# Patient Record
Sex: MALE | Employment: OTHER | RURAL
[De-identification: names, ages, dates, MRNs, and addresses within clinical notes are randomized per-mention and may not be internally consistent; named-entity substitution may affect disease eponyms.]

---

## 2017-12-05 ENCOUNTER — OFFICE VISIT (OUTPATIENT)
Dept: SURGERY | Age: 62
End: 2017-12-05

## 2017-12-05 VITALS
HEIGHT: 73 IN | BODY MASS INDEX: 24.45 KG/M2 | HEART RATE: 74 BPM | WEIGHT: 184.5 LBS | DIASTOLIC BLOOD PRESSURE: 78 MMHG | SYSTOLIC BLOOD PRESSURE: 108 MMHG

## 2017-12-05 DIAGNOSIS — Z12.11 SCREEN FOR COLON CANCER: Primary | ICD-10-CM

## 2017-12-06 RX ORDER — POLYETHYLENE GLYCOL 3350, SODIUM CHLORIDE, SODIUM BICARBONATE, POTASSIUM CHLORIDE 420; 11.2; 5.72; 1.48 G/4L; G/4L; G/4L; G/4L
POWDER, FOR SOLUTION ORAL
Qty: 1 BOTTLE | Refills: 0 | Status: SHIPPED | OUTPATIENT
Start: 2017-12-06 | End: 2017-12-18

## 2017-12-06 RX ORDER — BISACODYL 5 MG
TABLET, DELAYED RELEASE (ENTERIC COATED) ORAL
Qty: 1 TAB | Refills: 0 | Status: SHIPPED | OUTPATIENT
Start: 2017-12-06 | End: 2017-12-18

## 2017-12-06 RX ORDER — SODIUM CHLORIDE, SODIUM LACTATE, POTASSIUM CHLORIDE, CALCIUM CHLORIDE 600; 310; 30; 20 MG/100ML; MG/100ML; MG/100ML; MG/100ML
200 INJECTION, SOLUTION INTRAVENOUS CONTINUOUS
Status: CANCELLED | OUTPATIENT
Start: 2017-12-07 | End: 2017-12-07

## 2017-12-07 NOTE — PROGRESS NOTES
[address] [facility], [address] [facility]  Phone: [phone] Fax: [phone]                                              OFFICE NOTE    NAME: Marge Sung  : 1955    Chief Complaint:   Chief Complaint   Patient presents with    Colon Cancer Screening       History: Marge Sung is a 60 y.o.  male referred for colonoscopy. This is  the patient's first colonoscopy. The bowel movements are regular and brown. He   does not use any meds on a regular basis for his bowels. He denies any blood in stools or hemorrhoidal disease. Family history is negative for colon cancer but father had colon polyps. Past Medical History:   Diagnosis Date    Depression     Herpes zoster     HIV (human immunodeficiency virus infection) ([facility]     Hypercholesterolemia     Hypertension      History reviewed. No pertinent surgical history. Current Outpatient Prescriptions   Medication Sig Dispense Refill    peg-electrolyte soln (GAVILYTE-N) 420 gram solution Take as directed  Indications: BOWEL EVACUATION 1 Bottle 0    bisacodyl (DULCOLAX, BISACODYL,) 5 mg EC tablet Take as directed  Indications: BOWEL EVACUATION 1 Tab 0    ARIPiprazole (ABILIFY) 5 mg tablet Take  by mouth daily.  acyclovir (ZOVIRAX) 800 mg tablet Take 800 mg by mouth two (2) times a day.  testosterone (ANDROGEL) 1 % (50 mg/5 gram) glpk 50 mg by TransDERmal route daily.  abacavir-lamiVUDine (EPZICOM) 600-300 mg tablet Take 1 tablet by mouth daily.  escitalopram oxalate (LEXAPRO) 20 mg tablet Take 20 mg by mouth daily.  losartan-hydrochlorothiazide (HYZAAR) 50-12.5 mg per tablet Take 1 tablet by mouth daily.  lovastatin (MEVACOR) 20 mg tablet Take 20 mg by mouth daily.  nevirapine (VIRAMUNE) 200 mg tablet Take 200 mg by mouth two (2) times a day.  buPROPion XL (WELLBUTRIN XL) 150 mg tablet Take 150 mg by mouth every morning.        Allergies   Allergen Reactions    Lorabid [Loracarbef] Other (comments)     Fever, rash, erythema     Social History     Social History    Marital status: SINGLE     Spouse name: N/A    Number of children: N/A    Years of education: N/A     Occupational History    Not on file. Social History Main Topics    Smoking status: Current Every Day Smoker    Smokeless tobacco: Never Used    Alcohol use Yes    Drug use: Not on file    Sexual activity: Not on file     Other Topics Concern    Not on file     Social History Narrative     Family History   Problem Relation Age of Onset    No Known Problems Mother        Patient Active Problem List   Diagnosis Code    Anogenital HPV infection A63.0    [facility] B[facility]       Review of Systems:  Review of Systems   Constitutional: Negative for chills, fever, malaise/fatigue and weight loss. HENT: Positive for tinnitus. Negative for congestion, ear discharge, ear pain, hearing loss, nosebleeds and sore throat. Eyes: Negative for blurred vision, double vision, pain, discharge and redness. Respiratory: Negative for cough, sputum production, shortness of breath and wheezing. Cardiovascular: Negative for chest pain, palpitations and leg swelling. Gastrointestinal: Negative for abdominal pain, blood in stool, constipation, diarrhea, heartburn, melena, nausea and vomiting. Genitourinary: Negative for frequency, hematuria and urgency. Musculoskeletal: Negative for back pain, joint pain and neck pain. Skin: Negative for itching and rash. Neurological: Negative for dizziness, tremors, focal weakness, seizures, weakness and headaches. Endo/Heme/Allergies: Does not bruise/bleed easily. Psychiatric/Behavioral: Positive for depression. Negative for memory loss. The patient is not nervous/anxious and does not have insomnia.           Physical Exam:  Visit Vitals    /78 (BP 1 Location: Left arm, BP Patient Position: Sitting)    Pulse 74    Ht 6' 1\" (1.854 m)    Wt 184 lb 8 oz (83.7 kg)    BMI 24.34 kg/m2       Physical Exam   Constitutional: He is oriented to person, place, and time. He appears well-developed and well-nourished. No distress. HENT:   Head: Normocephalic and atraumatic. Right Ear: External ear normal.   Left Ear: External ear normal.   Nose: Nose normal.   Mouth/Throat: Oropharynx is clear and moist. No oropharyngeal exudate. Eyes: EOM are normal. Pupils are equal, round, and reactive to light. Right eye exhibits no discharge. Left eye exhibits no discharge. No scleral icterus. Neck: No tracheal deviation present. No thyromegaly present. Cardiovascular: Normal rate, regular rhythm and normal heart sounds. Exam reveals no friction rub. No murmur heard. Pulmonary/Chest: Effort normal and breath sounds normal. He has no wheezes. He has no rales. Abdominal: Soft. He exhibits no distension and no mass. There is no tenderness. Musculoskeletal: Normal range of motion. Lymphadenopathy:     He has no cervical adenopathy. Neurological: He is alert and oriented to person, place, and time. Skin: Skin is warm and dry. No rash noted. No erythema. Psychiatric: He has a normal mood and affect. His behavior is normal. Judgment and thought content normal.       Impression:  Need for screening colonoscopy    Plan:  Colonoscopy on 23/62/59  Risks and complications were explained to patient and they voiced understanding.     Lyle Dominguez M.D.

## 2017-12-18 PROBLEM — K57.30 DIVERTICULA OF COLON: Status: ACTIVE | Noted: 2017-12-18

## 2017-12-18 PROBLEM — Z12.11 SCREEN FOR COLON CANCER: Status: RESOLVED | Noted: 2017-12-18 | Resolved: 2017-12-18

## 2017-12-18 PROBLEM — Z12.11 SCREEN FOR COLON CANCER: Status: ACTIVE | Noted: 2017-12-18

## 2018-05-17 PROBLEM — F34.0 CYCLOTHYMIA: Status: ACTIVE | Noted: 2018-05-17

## 2021-01-07 ENCOUNTER — TELEPHONE (OUTPATIENT)
Dept: CARDIOLOGY CLINIC | Age: 66
End: 2021-01-07

## 2021-01-07 NOTE — TELEPHONE ENCOUNTER
Please call patient to schedule directly  To schedule      Ref by : [facility] in [facility]      Will send office notes.       Patients phone # [phone]      Thanks  Oneyda Bob

## 2021-02-04 ENCOUNTER — TRANSCRIBE ORDER (OUTPATIENT)
Dept: SCHEDULING | Age: 66
End: 2021-02-04

## 2021-02-04 DIAGNOSIS — Z92.21 HISTORY OF CHEMOTHERAPY: Primary | ICD-10-CM

## 2021-02-04 DIAGNOSIS — I10 HYPERTENSION: ICD-10-CM

## 2021-03-03 ENCOUNTER — OFFICE VISIT (OUTPATIENT)
Dept: CARDIOLOGY CLINIC | Age: 66
End: 2021-03-03
Payer: MEDICARE

## 2021-03-03 VITALS
DIASTOLIC BLOOD PRESSURE: 84 MMHG | HEIGHT: 73 IN | TEMPERATURE: 97 F | BODY MASS INDEX: 27.57 KG/M2 | RESPIRATION RATE: 16 BRPM | HEART RATE: 89 BPM | SYSTOLIC BLOOD PRESSURE: 122 MMHG | OXYGEN SATURATION: 96 % | WEIGHT: 208 LBS

## 2021-03-03 DIAGNOSIS — I45.4 IVCD (INTRAVENTRICULAR CONDUCTION DEFECT): ICD-10-CM

## 2021-03-03 DIAGNOSIS — I49.1 PAC (PREMATURE ATRIAL CONTRACTION): ICD-10-CM

## 2021-03-03 DIAGNOSIS — R60.0 LOCALIZED EDEMA: ICD-10-CM

## 2021-03-03 DIAGNOSIS — Z21 ASYMPTOMATIC HIV INFECTION (HCC): ICD-10-CM

## 2021-03-03 DIAGNOSIS — R06.00 DYSPNEA, UNSPECIFIED TYPE: ICD-10-CM

## 2021-03-03 DIAGNOSIS — I77.810 AORTIC ROOT DILATATION (HCC): ICD-10-CM

## 2021-03-03 DIAGNOSIS — I38 MILD VALVULAR HEART DISEASE: ICD-10-CM

## 2021-03-03 DIAGNOSIS — E78.5 DYSLIPIDEMIA: ICD-10-CM

## 2021-03-03 DIAGNOSIS — F90.9 ADULT ADHD: ICD-10-CM

## 2021-03-03 DIAGNOSIS — I10 ESSENTIAL HYPERTENSION: Primary | ICD-10-CM

## 2021-03-03 DIAGNOSIS — C21.0 ANAL CANCER (HCC): ICD-10-CM

## 2021-03-03 PROBLEM — Z78.9 HEPATITIS B IMMUNE: Status: ACTIVE | Noted: 2021-03-03

## 2021-03-03 PROBLEM — I45.10 RBBB: Status: ACTIVE | Noted: 2021-03-03

## 2021-03-03 PROCEDURE — G8536 NO DOC ELDER MAL SCRN: HCPCS | Performed by: INTERNAL MEDICINE

## 2021-03-03 PROCEDURE — 93000 ELECTROCARDIOGRAM COMPLETE: CPT | Performed by: INTERNAL MEDICINE

## 2021-03-03 PROCEDURE — 1101F PT FALLS ASSESS-DOCD LE1/YR: CPT | Performed by: INTERNAL MEDICINE

## 2021-03-03 PROCEDURE — G8510 SCR DEP NEG, NO PLAN REQD: HCPCS | Performed by: INTERNAL MEDICINE

## 2021-03-03 PROCEDURE — G8419 CALC BMI OUT NRM PARAM NOF/U: HCPCS | Performed by: INTERNAL MEDICINE

## 2021-03-03 PROCEDURE — 99204 OFFICE O/P NEW MOD 45 MIN: CPT | Performed by: INTERNAL MEDICINE

## 2021-03-03 PROCEDURE — G8754 DIAS BP LESS 90: HCPCS | Performed by: INTERNAL MEDICINE

## 2021-03-03 PROCEDURE — G8752 SYS BP LESS 140: HCPCS | Performed by: INTERNAL MEDICINE

## 2021-03-03 PROCEDURE — 3017F COLORECTAL CA SCREEN DOC REV: CPT | Performed by: INTERNAL MEDICINE

## 2021-03-03 PROCEDURE — G8427 DOCREV CUR MEDS BY ELIG CLIN: HCPCS | Performed by: INTERNAL MEDICINE

## 2021-03-03 NOTE — PROGRESS NOTES
Ambrose Yeboah is a 76 y.o. male is here for cardiac evaluation. Hx anal ca, s/p XRT/chemo (DC), HIV, hep B, dyslipidemia, hypertension, ADHD, previously noted LBBB/IVCD--moved to area and followed locally at [facility]. Recent problems with LE swelling/edema, seen by PCP, labs (not available)-attempted lasix, comp stockingss,  Echo 2/11/21 with LVEF 13-44, grade I diastolic, mildly dilated aortic root 4.3 cm, mild MR, mild TR. The patient denies chest pain/ shortness of breath, orthopnea, PND, palpitations, syncope, presyncope or fatigue.        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    Diverticula of colon 12/18/2017    Anogenital HPV infection 12/17/2014    HIV (human immunodeficiency virus infection) ([address] 12/17/2014      Other, MD Del  Past Medical History:   Diagnosis Date    Adult ADHD 3/3/2021    Anal cancer ([facility]     Aortic root dilatation ([address] 3/3/2021    Depression     Dyslipidemia 3/3/2021    Essential hypertension 3/3/2021    Hepatitis B immune 3/3/2021    Herpes zoster     HIV (human immunodeficiency virus infection) ([facility]     Hypercholesterolemia     Hypertension     IVCD (intraventricular conduction defect) 03/03/2021    Mild valvular heart disease 3/3/2021    Mild AI, MR, TR    PAC (premature atrial contraction) 3/3/2021      Past Surgical History:   Procedure Laterality Date    COLONOSCOPY N/A 12/18/2017    COLONOSCOPY performed by Raza Dominguez MD at [address] [facility] [[facility]] Other (comments)     Fever, rash, erythema      Family History   Problem Relation Age of Onset    Cancer Mother         liposarcoma    Melanoma Father     Colon Cancer Maternal Grandmother       Social History     Socioeconomic History    Marital status: SINGLE     Spouse name: Not on file    Number of children: Not on file    Years of education: Not on file    Highest education level: Not on file   Occupational History    Not on file   Social Needs    Financial resource strain: Not on file    Food insecurity     Worry: Not on file     Inability: Not on file    Transportation needs     Medical: Not on file     Non-medical: Not on file   Tobacco Use    Smoking status: Former Smoker     Packs/day: 0.25     Years: 5.00     Pack years: 1.25     Types: Cigarettes, Cigars     Quit date: 2018     Years since quitting: 3.1    Smokeless tobacco: Never Used    Tobacco comment: pt smoked cigars   Substance and Sexual Activity    Alcohol use: Yes    Drug use: Not on file    Sexual activity: Not on file   Lifestyle    Physical activity     Days per week: Not on file     Minutes per session: Not on file    Stress: Not on file   Relationships    Social connections     Talks on phone: Not on file     Gets together: Not on file     Attends [employer] service: Not on file     Active member of club or organization: Not on file     Attends meetings of clubs or organizations: Not on file     Relationship status: Not on file    Intimate partner violence     Fear of current or ex partner: Not on file     Emotionally abused: Not on file     Physically abused: Not on file     Forced sexual activity: Not on file   Other Topics Concern    Not on file   Social History Narrative    Not on file      Current Outpatient Medications   Medication Sig    [START ON 4/16/2021] amphetamine-dextroamphetamine XR (ADDERALL XR) 20 mg XR capsule Take 1 Cap by mouth daily for 30 days.  Max Daily Amount: 20 mg.    [START ON 3/18/2021] amphetamine-dextroamphetamine XR (ADDERALL XR) 20 mg XR capsule Take 1 Cap by mouth daily for 30 days. Max Daily Amount: 20 mg.    amphetamine-dextroamphetamine XR (ADDERALL XR) 20 mg XR capsule Take 1 Cap by mouth daily for 30 days. Max Daily Amount: 20 mg.    [START ON 4/16/2021] dextroamphetamine-amphetamine (ADDERALL) 20 mg tablet Take 1 Tab by mouth daily for 30 days. Max Daily Amount: 20 mg.    [START ON 3/18/2021] dextroamphetamine-amphetamine (ADDERALL) 20 mg tablet Take 1 Tab by mouth daily for 30 days. Max Daily Amount: 20 mg.    dextroamphetamine-amphetamine (ADDERALL) 20 mg tablet Take 1 Tab by mouth daily for 30 days. Max Daily Amount: 20 mg.    escitalopram oxalate (LEXAPRO) 10 mg tablet Take 1 Tab by mouth daily.  zolpidem (Ambien) 5 mg tablet Take 1 Tab by mouth nightly as needed for Sleep. Max Daily Amount: 5 mg.  atorvastatin (LIPITOR) 20 mg tablet Take  by mouth daily.  bictegrav/emtricit/tenofov ala (BIKTARVY PO) Take  by mouth.  losartan (COZAAR) 50 mg tablet Take 50 mg by mouth daily.  acyclovir (ZOVIRAX) 800 mg tablet Take 800 mg by mouth daily.  testosterone (ANDROGEL) 1 % (50 mg/5 gram) glpk 50 mg by TransDERmal route daily. No current facility-administered medications for this visit. Review of Symptoms:    CONST  No weight change. No fever, chills, sweats    ENT No visual changes, URI sx, sore throat    CV  See HPI   RESP  No cough, or sputum, wheezing. Also see HPI   GI  No abdominal pain or change in bowel habits. No heartburn or dysphagia. No melena or rectal bleeding.   No dysuria, urgency, frequency, hematuria   MSKEL  No joint pain, swelling. No muscle pain. SKIN  No rash or lesions. NEURO  No headache, syncope, or seizure. No weakness, loss of sensation, or paresthesias. PSYCH  No low mood or depression  No anxiety. HE/LYMPH  No easy bruising, abnormal bleeding, or enlarged glands.         Physical ExamPhysical Exam:    Visit Vitals  /84 (BP 1 Location: Left arm, BP Patient Position: Sitting, BP Cuff Size: Adult) Pulse 89   Temp 97 °F (36.1 °C) (Temporal)   Resp 16   Ht 6' 1\" (1.854 m)   Wt 208 lb (94.3 kg)   SpO2 96%   BMI 27.44 kg/m²     Gen: NAD  HEENT:  PERRL, throat clear  Neck: no adenopathy, no thyromegaly, no JVD   Heart:  sl irregular,Nl S1S2,  I/VI murmur, no gallop or rub. Lungs:  clear  Abdomen:   Soft, non-tender, bowel sounds are active.    Extremities:  Mild edema  Pulse: symmetric  Neuro: A&O times 3, No focal neuro deficits    Cardiographics    ECG: NSR, IVCD, PAC\"s, NSST    Labs:   Lab Results   Component Value Date/Time    Sodium 136 07/17/2019 10:05 PM    Sodium 136 08/04/2018 08:00 PM    Sodium 141 12/11/2017 01:45 PM    Potassium 4.3 07/17/2019 10:05 PM    Potassium 4.0 08/04/2018 08:00 PM    Potassium 4.7 12/11/2017 01:45 PM    Chloride 99 07/17/2019 10:05 PM    Chloride 99 08/04/2018 08:00 PM    Chloride 103 12/11/2017 01:45 PM    CO2 25 07/17/2019 10:05 PM    CO2 33 (H) 08/04/2018 08:00 PM    CO2 30 12/11/2017 01:45 PM    Anion gap 12 07/17/2019 10:05 PM    Anion gap 4 (L) 08/04/2018 08:00 PM    Anion gap 8 12/11/2017 01:45 PM    Glucose 142 (H) 07/17/2019 10:05 PM    Glucose 85 08/04/2018 08:00 PM    Glucose 100 12/11/2017 01:45 PM    BUN 16 07/17/2019 10:05 PM    BUN 17 08/04/2018 08:00 PM    BUN 24 (H) 12/11/2017 01:45 PM    Creatinine 1.25 07/17/2019 10:05 PM    Creatinine 1.11 08/04/2018 08:00 PM    Creatinine 1.02 12/11/2017 01:45 PM    BUN/Creatinine ratio 13 07/17/2019 10:05 PM    BUN/Creatinine ratio 15 08/04/2018 08:00 PM    BUN/Creatinine ratio 24 (H) 12/11/2017 01:45 PM    GFR est AA >60 07/17/2019 10:05 PM    GFR est AA >60 08/04/2018 08:00 PM    GFR est AA >60 12/11/2017 01:45 PM    GFR est non-AA 58 (L) 07/17/2019 10:05 PM    GFR est non-AA >60 08/04/2018 08:00 PM    GFR est non-AA >60 12/11/2017 01:45 PM    Calcium 8.6 07/17/2019 10:05 PM    Calcium 8.5 08/04/2018 08:00 PM    Calcium 8.9 12/11/2017 01:45 PM    Bilirubin, total 0.9 07/17/2019 10:05 PM    Bilirubin, total 0.8 08/04/2018 08:00 PM    Alk. phosphatase 66 07/17/2019 10:05 PM    Alk. phosphatase 67 08/04/2018 08:00 PM    Protein, total 7.1 07/17/2019 10:05 PM    Protein, total 7.0 08/04/2018 08:00 PM    Albumin 3.6 07/17/2019 10:05 PM    Albumin 3.7 08/04/2018 08:00 PM    Globulin 3.5 07/17/2019 10:05 PM    Globulin 3.3 08/04/2018 08:00 PM    A-G Ratio 1.0 (L) 07/17/2019 10:05 PM    A-G Ratio 1.1 08/04/2018 08:00 PM    ALT (SGPT) 28 07/17/2019 10:05 PM    ALT (SGPT) 40 08/04/2018 08:00 PM     No results found for: CPK, CPKX, CPX  No results found for: CHOL, CHOLX, CHLST, CHOLV, 240709, HDL, HDLP, LDL, LDLC, DLDLP, TGLX, TRIGL, TRIGP, CHHD, CHHDX  No results found for this or any previous visit. 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 Recent problems with LE swelling/edema, seen by PCP, labs (not available)-attempted lasix, comp stockingss,  Echo 2/11/21 with LVEF 15-40, grade I diastolic, mildly dilated aortic root 4.3 cm, mild MR, mild TR.       Plan:     Darral Plume MPI--abnormal EKG/IVCD/BBB, dyspnea, multiple CV risks  Echo reviewed and discussed with pt  Outside records reviewed  Continue lasix/comp stockings, limit Na   Fu with PCP as planned  RTC 6 mos  Some diastolic dysfunction and mild valvular disease--no evidence CM (chemo or HIV), ?component of lymphedema/venous stasis post XRT to pelvis    Isidra Lundberg MD

## 2021-03-03 NOTE — LETTER
3/3/2021 Patient: Antonio Ordonez YOB: 1955 Date of Visit: 3/3/2021 Pretty Watkins NP 
[facility] [address] [id number] [address] Fax: [phone] Dear Pretty Watkins NP, Thank you for referring Mr. Hugo Llamas to [facility] for evaluation. My notes for this consultation are attached. If you have questions, please do not hesitate to call me. I look forward to following your patient along with you.  
 
 
Sincerely, 
 
Isidra Lundberg MD

## 2021-03-03 NOTE — PROGRESS NOTES
Chief Complaint   Patient presents with    New Patient     to establish care      Verified patient with two patient identifiers. Medications reviewed/approved by Dr. Chandler Lyons. 1. Have you been to the ER, urgent care clinic since your last visit? Hospitalized since your last visit?no    2. Have you seen or consulted any other health care providers outside of the [address] [place] [address] since your last visit? Include any pap smears or colon screening.  no

## 2021-03-07 ENCOUNTER — PATIENT MESSAGE (OUTPATIENT)
Dept: CARDIOLOGY CLINIC | Age: 66
End: 2021-03-07

## 2021-03-08 NOTE — TELEPHONE ENCOUNTER
Letty Hickman, [place] [birth year] 3:61 PM EST    Can you do this request?  ----- Message -----  From: Genna Foote  Sent: 3/7/2021 9:05 PM EST  To: [facility]  Subject: Test Results Question     Can you please post a copy of my EKG so that I can down load a copy for my files? Thank you.   Ingrid Rodriguez

## 2021-04-05 ENCOUNTER — TELEPHONE (OUTPATIENT)
Dept: NON INVASIVE DIAGNOSTICS | Age: 66
End: 2021-04-05

## 2021-04-06 ENCOUNTER — HOSPITAL ENCOUNTER (OUTPATIENT)
Dept: NUCLEAR MEDICINE | Age: 66
Discharge: HOME | End: 2021-04-06
Attending: INTERNAL MEDICINE
Payer: MEDICARE

## 2021-04-06 ENCOUNTER — HOSPITAL ENCOUNTER (OUTPATIENT)
Dept: NON INVASIVE DIAGNOSTICS | Age: 66
Discharge: HOME | End: 2021-04-06
Attending: INTERNAL MEDICINE
Payer: MEDICARE

## 2021-04-06 DIAGNOSIS — E78.5 DYSLIPIDEMIA: ICD-10-CM

## 2021-04-06 DIAGNOSIS — R06.00 DYSPNEA, UNSPECIFIED TYPE: ICD-10-CM

## 2021-04-06 DIAGNOSIS — I10 ESSENTIAL HYPERTENSION: ICD-10-CM

## 2021-04-06 DIAGNOSIS — I38 MILD VALVULAR HEART DISEASE: ICD-10-CM

## 2021-04-06 DIAGNOSIS — I45.4 IVCD (INTRAVENTRICULAR CONDUCTION DEFECT): ICD-10-CM

## 2021-04-06 LAB
STRESS BASELINE DIAS BP: 84 MMHG
STRESS BASELINE HR: 51 BPM
STRESS BASELINE SYS BP: 134 MMHG
STRESS ESTIMATED WORKLOAD: 1 METS
STRESS EXERCISE DUR MIN: 2.32 MIN:SEC
STRESS PEAK DIAS BP: 81 MMHG
STRESS PEAK SYS BP: 139 MMHG
STRESS PERCENT HR ACHIEVED: 41 %
STRESS POST PEAK HR: 63 BPM
STRESS RATE PRESSURE PRODUCT: 8757 BPM*MMHG
STRESS ST DEPRESSION: 0 MM
STRESS ST ELEVATION: 0 MM
STRESS TARGET HR: 153 BPM

## 2021-04-06 PROCEDURE — A9500 TC99M SESTAMIBI: HCPCS

## 2021-04-06 PROCEDURE — 74011250636 HC RX REV CODE- 250/636: Performed by: INTERNAL MEDICINE

## 2021-04-06 RX ADMIN — REGADENOSON 0.4 MG: 0.08 INJECTION, SOLUTION INTRAVENOUS at 08:24

## 2021-04-09 ENCOUNTER — TELEPHONE (OUTPATIENT)
Dept: CARDIOLOGY CLINIC | Age: 66
End: 2021-04-09

## 2021-04-09 NOTE — TELEPHONE ENCOUNTER
----- Message from Saqib Howe MD sent at 4/6/2021  2:57 PM EDT -----  Regarding: Jodi Spatz stress test  Advise stress nuclear nuclear scan normal--no blockages or ischemia, normal LV pumping function. RTC 6 mos, sooner prn.   Thanks [facility] [place] [facility]

## 2021-06-01 ENCOUNTER — TELEPHONE (OUTPATIENT)
Dept: PSYCHIATRY | Age: 66
End: 2021-06-01

## 2021-06-01 DIAGNOSIS — F90.0 ATTENTION DEFICIT HYPERACTIVITY DISORDER (ADHD), PREDOMINANTLY INATTENTIVE TYPE: ICD-10-CM

## 2021-06-01 RX ORDER — DEXTROAMPHETAMINE SACCHARATE, AMPHETAMINE ASPARTATE MONOHYDRATE, DEXTROAMPHETAMINE SULFATE AND AMPHETAMINE SULFATE 5; 5; 5; 5 MG/1; MG/1; MG/1; MG/1
20 CAPSULE, EXTENDED RELEASE ORAL DAILY
Qty: 30 CAPSULE | Refills: 0 | Status: SHIPPED | OUTPATIENT
Start: 2021-06-01 | End: 2021-09-28 | Stop reason: SDUPTHER

## 2021-06-01 RX ORDER — DEXTROAMPHETAMINE SACCHARATE, AMPHETAMINE ASPARTATE, DEXTROAMPHETAMINE SULFATE AND AMPHETAMINE SULFATE 5; 5; 5; 5 MG/1; MG/1; MG/1; MG/1
20 TABLET ORAL DAILY
Qty: 30 TABLET | Refills: 0 | Status: SHIPPED | OUTPATIENT
Start: 2021-07-31 | End: 2021-09-28 | Stop reason: SDUPTHER

## 2021-06-01 RX ORDER — DEXTROAMPHETAMINE SACCHARATE, AMPHETAMINE ASPARTATE, DEXTROAMPHETAMINE SULFATE AND AMPHETAMINE SULFATE 5; 5; 5; 5 MG/1; MG/1; MG/1; MG/1
20 TABLET ORAL DAILY
Qty: 30 TABLET | Refills: 0 | Status: SHIPPED | OUTPATIENT
Start: 2021-07-01 | End: 2021-09-28 | Stop reason: SDUPTHER

## 2021-06-01 RX ORDER — DEXTROAMPHETAMINE SACCHARATE, AMPHETAMINE ASPARTATE MONOHYDRATE, DEXTROAMPHETAMINE SULFATE AND AMPHETAMINE SULFATE 5; 5; 5; 5 MG/1; MG/1; MG/1; MG/1
20 CAPSULE, EXTENDED RELEASE ORAL DAILY
Qty: 30 CAPSULE | Refills: 0 | Status: SHIPPED | OUTPATIENT
Start: 2021-07-01 | End: 2021-09-28 | Stop reason: SDUPTHER

## 2021-06-01 RX ORDER — DEXTROAMPHETAMINE SACCHARATE, AMPHETAMINE ASPARTATE, DEXTROAMPHETAMINE SULFATE AND AMPHETAMINE SULFATE 5; 5; 5; 5 MG/1; MG/1; MG/1; MG/1
20 TABLET ORAL DAILY
Qty: 30 TABLET | Refills: 0 | Status: SHIPPED | OUTPATIENT
Start: 2021-06-01 | End: 2021-09-28 | Stop reason: SDUPTHER

## 2021-06-01 RX ORDER — DEXTROAMPHETAMINE SACCHARATE, AMPHETAMINE ASPARTATE MONOHYDRATE, DEXTROAMPHETAMINE SULFATE AND AMPHETAMINE SULFATE 5; 5; 5; 5 MG/1; MG/1; MG/1; MG/1
20 CAPSULE, EXTENDED RELEASE ORAL DAILY
Qty: 30 CAPSULE | Refills: 0 | Status: SHIPPED | OUTPATIENT
Start: 2021-07-31 | End: 2021-09-28 | Stop reason: SDUPTHER

## 2021-06-07 ENCOUNTER — HOSPITAL ENCOUNTER (OUTPATIENT)
Dept: BEHAVIORAL/MENTAL HEALTH | Age: 66
Discharge: HOME | End: 2021-06-07
Payer: MEDICARE

## 2021-06-07 VITALS
HEART RATE: 64 BPM | DIASTOLIC BLOOD PRESSURE: 90 MMHG | BODY MASS INDEX: 26.52 KG/M2 | WEIGHT: 201 LBS | SYSTOLIC BLOOD PRESSURE: 128 MMHG

## 2021-06-07 DIAGNOSIS — F34.0 CYCLOTHYMIA: ICD-10-CM

## 2021-06-07 DIAGNOSIS — F90.9 ADULT ADHD: ICD-10-CM

## 2021-06-07 PROCEDURE — 99214 OFFICE O/P EST MOD 30 MIN: CPT | Performed by: NURSE PRACTITIONER

## 2021-06-07 RX ORDER — ESCITALOPRAM OXALATE 10 MG/1
10 TABLET ORAL DAILY
Qty: 90 TABLET | Refills: 2 | Status: SHIPPED | OUTPATIENT
Start: 2021-06-07 | End: 2021-11-08 | Stop reason: SDUPTHER

## 2021-06-07 NOTE — BH NOTES
Name: Molly Sexton    MRN:  [id number]   Time In: 3:00 PM     Time Out: 3:27 PM  Date: 6/7/2021               Collateral: none    Patient Identification: *Molly Sexton is a 76year old single retired male. He lives with his sister and brother-in-law    Progress Note: Jenna Richardson has been doing well. He reports a positive mood without irritability. 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 He is able to stay focused on the work. Jenna Richardson admits to probably not getting enough sleep. He stays up until 2 AM and wakes around 9 AM.  He falls asleep readily and rarely uses the Ambien. He is sometimes sleepy in the afternoon but feels refreshed after a 15 minute power nap. Jenna Richardson has been healthy, with some side effects from his chemotherapy. He has been walking about 1.5 miles nightly. He has no new concerns at this time. Mental Status Examination    I. Reliability in Providing Information: Good (06/07/21 1525)    II. Personal Presentation: Looks stated age;Dresses appropriately (06/07/21 1525)    III. Motor Activity: Normal;Unremarkable (06/07/21 1525)    IV. Speech Pattern: Normal rate;Normal rhythm (06/07/21 1525)    V. Mood: Euthymic (06/07/21 1525)    Vl. Eye Contact: Good (06/07/21 1525)    Vll. Affect: Appropriate (06/07/21 1525)    VllI. Thought Processes        Thought Process: Organized;Goal directed (06/07/21 1525)        Thought Content: Unremarkable (06/07/21 1525)        Hallucinations: None (06/07/21 1525)        Delusions: None (06/07/21 1525)        Suicidal Ideation/Attempts: No (06/07/21 1525)                 Homicidal Ideation/Attempts: No (06/07/21 1525)        Obsessional and Compulsive Symptoms: Absent (06/07/21 1525)    IX.  Cognitive Functions       Orientation Level: Oriented x4 (06/07/21 1525)       Neurologic State: Alert (06/07/21 1525)       Attention/Concentration: Attentive (06/07/21 1525)       Abstract Thinking: Intact (06/07/21 1525)       Estimate of Intelligence: Average (06/07/21 1525)       Judgment: Good (06/07/21 1525)       Insight: Good (06/07/21 1525)       Memory evaluation: No (06/07/21 1525)                                    X.Risks: None evident (06/07/21 1525)     XI. Strengths and Assets Inventory: Intelligence; Family Support; Cooperative;Employment Status; Adequate living arrangements; Interests/Hobbies (06/07/21 1525)    VITALS:     Patient Vitals for the past 24 hrs:   Pulse BP   06/07/21 1521 64 (!) 128/90     Wt Readings from Last 3 Encounters:   06/07/21 91.2 kg (201 lb)   03/03/21 94.3 kg (208 lb)   03/16/20 85.3 kg (188 lb)     Temp Readings from Last 3 Encounters:   03/03/21 97 °F (36.1 °C) (Temporal)   07/20/19 97.7 °F (36.5 °C)   07/17/19 98.7 °F (37.1 °C)     BP Readings from Last 3 Encounters:   06/07/21 (!) 128/90   03/03/21 122/84   03/16/20 108/68     Pulse Readings from Last 3 Encounters:   06/07/21 64   03/03/21 89   03/16/20 68       Assessment: Jj Carey is stable on his current medications. 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 Continue Adderall XR 20 mg in the AM and 20 mg IR in the afternoon. 2.  Continue Lexapro 10 mg.  3.  Continue Ambien 5 mg HS prn insomnia. 4.  Return for follow up in 1 year.

## 2021-08-27 ENCOUNTER — TRANSCRIBE ORDER (OUTPATIENT)
Dept: SCHEDULING | Age: 66
End: 2021-08-27

## 2021-08-27 DIAGNOSIS — M81.0 OSTEOPOROSIS: Primary | ICD-10-CM

## 2021-09-28 ENCOUNTER — TELEPHONE (OUTPATIENT)
Dept: PSYCHIATRY | Age: 66
End: 2021-09-28

## 2021-09-28 DIAGNOSIS — F90.0 ATTENTION DEFICIT HYPERACTIVITY DISORDER (ADHD), PREDOMINANTLY INATTENTIVE TYPE: ICD-10-CM

## 2021-09-28 RX ORDER — DEXTROAMPHETAMINE SACCHARATE, AMPHETAMINE ASPARTATE, DEXTROAMPHETAMINE SULFATE AND AMPHETAMINE SULFATE 5; 5; 5; 5 MG/1; MG/1; MG/1; MG/1
20 TABLET ORAL DAILY
Qty: 30 TABLET | Refills: 0 | Status: SHIPPED | OUTPATIENT
Start: 2021-09-28 | End: 2022-01-10 | Stop reason: SDUPTHER

## 2021-09-28 RX ORDER — DEXTROAMPHETAMINE SACCHARATE, AMPHETAMINE ASPARTATE MONOHYDRATE, DEXTROAMPHETAMINE SULFATE AND AMPHETAMINE SULFATE 5; 5; 5; 5 MG/1; MG/1; MG/1; MG/1
20 CAPSULE, EXTENDED RELEASE ORAL DAILY
Qty: 30 CAPSULE | Refills: 0 | Status: SHIPPED | OUTPATIENT
Start: 2021-11-26 | End: 2022-01-10 | Stop reason: SDUPTHER

## 2021-09-28 RX ORDER — DEXTROAMPHETAMINE SACCHARATE, AMPHETAMINE ASPARTATE MONOHYDRATE, DEXTROAMPHETAMINE SULFATE AND AMPHETAMINE SULFATE 5; 5; 5; 5 MG/1; MG/1; MG/1; MG/1
20 CAPSULE, EXTENDED RELEASE ORAL DAILY
Qty: 30 CAPSULE | Refills: 0 | Status: SHIPPED | OUTPATIENT
Start: 2021-10-28 | End: 2022-01-10 | Stop reason: SDUPTHER

## 2021-09-28 RX ORDER — DEXTROAMPHETAMINE SACCHARATE, AMPHETAMINE ASPARTATE, DEXTROAMPHETAMINE SULFATE AND AMPHETAMINE SULFATE 5; 5; 5; 5 MG/1; MG/1; MG/1; MG/1
20 TABLET ORAL DAILY
Qty: 30 TABLET | Refills: 0 | Status: SHIPPED | OUTPATIENT
Start: 2021-10-28 | End: 2022-01-10 | Stop reason: SDUPTHER

## 2021-09-28 RX ORDER — DEXTROAMPHETAMINE SACCHARATE, AMPHETAMINE ASPARTATE MONOHYDRATE, DEXTROAMPHETAMINE SULFATE AND AMPHETAMINE SULFATE 5; 5; 5; 5 MG/1; MG/1; MG/1; MG/1
20 CAPSULE, EXTENDED RELEASE ORAL DAILY
Qty: 30 CAPSULE | Refills: 0 | Status: SHIPPED | OUTPATIENT
Start: 2021-09-28 | End: 2022-01-10 | Stop reason: SDUPTHER

## 2021-09-28 RX ORDER — DEXTROAMPHETAMINE SACCHARATE, AMPHETAMINE ASPARTATE, DEXTROAMPHETAMINE SULFATE AND AMPHETAMINE SULFATE 5; 5; 5; 5 MG/1; MG/1; MG/1; MG/1
20 TABLET ORAL DAILY
Qty: 30 TABLET | Refills: 0 | Status: SHIPPED | OUTPATIENT
Start: 2021-11-26 | End: 2022-01-10 | Stop reason: SDUPTHER

## 2021-11-08 ENCOUNTER — TELEPHONE (OUTPATIENT)
Dept: PSYCHIATRY | Age: 66
End: 2021-11-08

## 2021-11-08 RX ORDER — ESCITALOPRAM OXALATE 10 MG/1
10 TABLET ORAL DAILY
Qty: 90 TABLET | Refills: 2 | Status: SHIPPED | OUTPATIENT
Start: 2021-11-08 | End: 2022-03-31 | Stop reason: SDUPTHER

## 2021-11-30 ENCOUNTER — TELEPHONE (OUTPATIENT)
Dept: PSYCHIATRY | Age: 66
End: 2021-11-30

## 2021-11-30 DIAGNOSIS — F90.2 ADHD (ATTENTION DEFICIT HYPERACTIVITY DISORDER), COMBINED TYPE: ICD-10-CM

## 2021-11-30 RX ORDER — ZOLPIDEM TARTRATE 5 MG/1
5 TABLET ORAL
Qty: 30 TABLET | Refills: 3 | Status: SHIPPED | OUTPATIENT
Start: 2021-11-30 | End: 2022-01-17 | Stop reason: SDUPTHER

## 2022-01-10 ENCOUNTER — TELEPHONE (OUTPATIENT)
Dept: PSYCHIATRY | Age: 67
End: 2022-01-10

## 2022-01-10 DIAGNOSIS — F90.0 ATTENTION DEFICIT HYPERACTIVITY DISORDER (ADHD), PREDOMINANTLY INATTENTIVE TYPE: ICD-10-CM

## 2022-01-10 RX ORDER — DEXTROAMPHETAMINE SACCHARATE, AMPHETAMINE ASPARTATE, DEXTROAMPHETAMINE SULFATE AND AMPHETAMINE SULFATE 5; 5; 5; 5 MG/1; MG/1; MG/1; MG/1
20 TABLET ORAL DAILY
Qty: 30 TABLET | Refills: 0 | Status: SHIPPED | OUTPATIENT
Start: 2022-01-10 | End: 2022-03-31

## 2022-01-10 RX ORDER — DEXTROAMPHETAMINE SACCHARATE, AMPHETAMINE ASPARTATE MONOHYDRATE, DEXTROAMPHETAMINE SULFATE AND AMPHETAMINE SULFATE 5; 5; 5; 5 MG/1; MG/1; MG/1; MG/1
20 CAPSULE, EXTENDED RELEASE ORAL DAILY
Qty: 30 CAPSULE | Refills: 0 | Status: SHIPPED | OUTPATIENT
Start: 2022-01-10 | End: 2022-03-31

## 2022-01-10 RX ORDER — DEXTROAMPHETAMINE SACCHARATE, AMPHETAMINE ASPARTATE MONOHYDRATE, DEXTROAMPHETAMINE SULFATE AND AMPHETAMINE SULFATE 5; 5; 5; 5 MG/1; MG/1; MG/1; MG/1
20 CAPSULE, EXTENDED RELEASE ORAL DAILY
Qty: 30 CAPSULE | Refills: 0 | Status: SHIPPED | OUTPATIENT
Start: 2022-02-09 | End: 2022-03-31

## 2022-01-10 RX ORDER — DEXTROAMPHETAMINE SACCHARATE, AMPHETAMINE ASPARTATE, DEXTROAMPHETAMINE SULFATE AND AMPHETAMINE SULFATE 5; 5; 5; 5 MG/1; MG/1; MG/1; MG/1
20 TABLET ORAL DAILY
Qty: 30 TABLET | Refills: 0 | Status: SHIPPED | OUTPATIENT
Start: 2022-03-10 | End: 2022-03-31

## 2022-01-10 RX ORDER — DEXTROAMPHETAMINE SACCHARATE, AMPHETAMINE ASPARTATE, DEXTROAMPHETAMINE SULFATE AND AMPHETAMINE SULFATE 5; 5; 5; 5 MG/1; MG/1; MG/1; MG/1
20 TABLET ORAL DAILY
Qty: 30 TABLET | Refills: 0 | Status: SHIPPED | OUTPATIENT
Start: 2022-02-09 | End: 2022-03-31

## 2022-01-10 RX ORDER — DEXTROAMPHETAMINE SACCHARATE, AMPHETAMINE ASPARTATE MONOHYDRATE, DEXTROAMPHETAMINE SULFATE AND AMPHETAMINE SULFATE 5; 5; 5; 5 MG/1; MG/1; MG/1; MG/1
20 CAPSULE, EXTENDED RELEASE ORAL DAILY
Qty: 30 CAPSULE | Refills: 0 | Status: SHIPPED | OUTPATIENT
Start: 2022-03-10 | End: 2022-03-31

## 2022-01-17 ENCOUNTER — TELEPHONE (OUTPATIENT)
Dept: PSYCHIATRY | Age: 67
End: 2022-01-17

## 2022-01-17 DIAGNOSIS — F41.1 GENERALIZED ANXIETY DISORDER: Primary | ICD-10-CM

## 2022-01-17 DIAGNOSIS — G47.00 INSOMNIA DISORDER WITH NON-SLEEP DISORDER MENTAL COMORBIDITY: ICD-10-CM

## 2022-01-17 RX ORDER — ZOLPIDEM TARTRATE 5 MG/1
5 TABLET ORAL
Qty: 90 TABLET | Refills: 2 | Status: SHIPPED | OUTPATIENT
Start: 2022-01-17 | End: 2022-03-31 | Stop reason: SDUPTHER

## 2022-03-18 PROBLEM — I45.10 RBBB: Status: ACTIVE | Noted: 2021-03-03

## 2022-03-19 PROBLEM — E78.5 DYSLIPIDEMIA: Status: ACTIVE | Noted: 2021-03-03

## 2022-03-19 PROBLEM — F34.0 CYCLOTHYMIA: Status: ACTIVE | Noted: 2018-05-17

## 2022-03-19 PROBLEM — I38 MILD VALVULAR HEART DISEASE: Status: ACTIVE | Noted: 2021-03-03

## 2022-03-19 PROBLEM — F90.9 ADULT ADHD: Status: ACTIVE | Noted: 2021-03-03

## 2022-03-19 PROBLEM — I10 ESSENTIAL HYPERTENSION: Status: ACTIVE | Noted: 2021-03-03

## 2022-03-19 PROBLEM — Z78.9 HEPATITIS B IMMUNE: Status: ACTIVE | Noted: 2021-03-03

## 2022-03-19 PROBLEM — K57.30 DIVERTICULA OF COLON: Status: ACTIVE | Noted: 2017-12-18

## 2022-03-20 PROBLEM — I49.1 PAC (PREMATURE ATRIAL CONTRACTION): Status: ACTIVE | Noted: 2021-03-03

## 2022-03-20 PROBLEM — I77.810 AORTIC ROOT DILATATION (HCC): Status: ACTIVE | Noted: 2021-03-03

## 2022-03-31 ENCOUNTER — HOSPITAL ENCOUNTER (OUTPATIENT)
Dept: BEHAVIORAL/MENTAL HEALTH | Age: 67
Discharge: HOME | End: 2022-03-31
Payer: MEDICARE

## 2022-03-31 DIAGNOSIS — F34.0 CYCLOTHYMIA: ICD-10-CM

## 2022-03-31 DIAGNOSIS — G47.00 INSOMNIA DISORDER WITH NON-SLEEP DISORDER MENTAL COMORBIDITY: ICD-10-CM

## 2022-03-31 DIAGNOSIS — F90.9 ADULT ADHD: Primary | ICD-10-CM

## 2022-03-31 PROCEDURE — 99213 OFFICE O/P EST LOW 20 MIN: CPT | Performed by: NURSE PRACTITIONER

## 2022-03-31 RX ORDER — DEXTROAMPHETAMINE SACCHARATE, AMPHETAMINE ASPARTATE, DEXTROAMPHETAMINE SULFATE AND AMPHETAMINE SULFATE 5; 5; 5; 5 MG/1; MG/1; MG/1; MG/1
20 TABLET ORAL DAILY
Qty: 30 TABLET | Refills: 0 | Status: SHIPPED | OUTPATIENT
Start: 2022-06-03 | End: 2022-07-03

## 2022-03-31 RX ORDER — BICTEGRAVIR SODIUM, EMTRICITABINE, AND TENOFOVIR ALAFENAMIDE FUMARATE 30; 120; 15 MG/1; MG/1; MG/1
1 TABLET ORAL DAILY
COMMUNITY

## 2022-03-31 RX ORDER — DEXTROAMPHETAMINE SACCHARATE, AMPHETAMINE ASPARTATE, DEXTROAMPHETAMINE SULFATE AND AMPHETAMINE SULFATE 5; 5; 5; 5 MG/1; MG/1; MG/1; MG/1
20 TABLET ORAL DAILY
Qty: 30 TABLET | Refills: 0 | Status: SHIPPED | OUTPATIENT
Start: 2022-05-06 | End: 2022-06-05

## 2022-03-31 RX ORDER — ZOLPIDEM TARTRATE 5 MG/1
5 TABLET ORAL
Qty: 30 TABLET | Refills: 2 | Status: SHIPPED | OUTPATIENT
Start: 2022-03-31

## 2022-03-31 RX ORDER — DEXTROAMPHETAMINE SACCHARATE, AMPHETAMINE ASPARTATE MONOHYDRATE, DEXTROAMPHETAMINE SULFATE AND AMPHETAMINE SULFATE 5; 5; 5; 5 MG/1; MG/1; MG/1; MG/1
20 CAPSULE, EXTENDED RELEASE ORAL DAILY
Qty: 30 CAPSULE | Refills: 0 | Status: SHIPPED | OUTPATIENT
Start: 2022-05-06 | End: 2022-06-04

## 2022-03-31 RX ORDER — DEXTROAMPHETAMINE SACCHARATE, AMPHETAMINE ASPARTATE MONOHYDRATE, DEXTROAMPHETAMINE SULFATE AND AMPHETAMINE SULFATE 5; 5; 5; 5 MG/1; MG/1; MG/1; MG/1
20 CAPSULE, EXTENDED RELEASE ORAL DAILY
Qty: 30 CAPSULE | Refills: 0 | Status: SHIPPED | OUTPATIENT
Start: 2022-04-08 | End: 2022-05-08

## 2022-03-31 RX ORDER — DEXTROAMPHETAMINE SACCHARATE, AMPHETAMINE ASPARTATE, DEXTROAMPHETAMINE SULFATE AND AMPHETAMINE SULFATE 5; 5; 5; 5 MG/1; MG/1; MG/1; MG/1
20 TABLET ORAL DAILY
Qty: 30 TABLET | Refills: 0 | Status: SHIPPED | OUTPATIENT
Start: 2022-04-04 | End: 2022-05-04

## 2022-03-31 RX ORDER — DEXTROAMPHETAMINE SACCHARATE, AMPHETAMINE ASPARTATE MONOHYDRATE, DEXTROAMPHETAMINE SULFATE AND AMPHETAMINE SULFATE 5; 5; 5; 5 MG/1; MG/1; MG/1; MG/1
20 CAPSULE, EXTENDED RELEASE ORAL DAILY
Qty: 30 CAPSULE | Refills: 0 | Status: SHIPPED | OUTPATIENT
Start: 2022-06-03 | End: 2022-07-02

## 2022-03-31 RX ORDER — ESCITALOPRAM OXALATE 10 MG/1
10 TABLET ORAL DAILY
Qty: 90 TABLET | Refills: 3 | Status: SHIPPED | OUTPATIENT
Start: 2022-03-31

## 2022-03-31 NOTE — BH NOTES
Name: Edis Lujan    MRN:  [id number]   Time In: 9:40 AM     Time Out: 10:00 AM  Date: 3/31/2022           Collateral: NP student, All Page      Patient Identification: Edis Lujan is a 75year old single retired male. He lives with his sister and brother-in-law        Progress Note: Violetta Maceod has been doing well. He has gotten a clean bill of help from his oncologist.  He has been cancer free for 2 years. He is feeling good. Violetta Macedo feels that his medications are working well. His mood has been good. He has been focused and able to get things accomplished. He recently closed a show at the theater. Violetta Macedo has been sleeping well about 8-10 hours. During the month of a show, he only gets 5-6 hours. He only uses the Ambien occasionally. Violetta Macedo is tolerating his medications well. He does not desire any changes at this time. Mental Status Examination    I. Reliability in Providing Information: Good (03/31/22 0959)    II. Personal Presentation: Looks stated age;Dresses appropriately (03/31/22 0785)    III. Motor Activity: Normal;Unremarkable (03/31/22 0959)    IV. Speech Pattern: Normal rate;Normal rhythm (03/31/22 0959)    V. Mood: Euthymic (03/31/22 0959)    Vl. Eye Contact: Good (03/31/22 0959)    Vll. Affect: Appropriate (03/31/22 0959)    VllI. Thought Processes        Thought Process: Organized;Goal directed (03/31/22 0959)        Thought Content: Unremarkable (03/31/22 0959)        Hallucinations: None (03/31/22 0959)        Delusions: None (03/31/22 0959)        Suicidal Ideation/Attempts: No (03/31/22 0959)                 Homicidal Ideation/Attempts: No (03/31/22 0959)        Obsessional and Compulsive Symptoms: Absent (03/31/22 0959)    IX.  Cognitive Functions       Orientation Level: Oriented x4 (03/31/22 0959)       Neurologic State: Alert (03/31/22 0390)       Attention/Concentration: Attentive (03/31/22 7981)       Abstract Thinking: Intact (03/31/22 0959)       Estimate of Intelligence: Average (03/31/22 8358)       Judgment: Good (03/31/22 3863)       Insight: Good (03/31/22 0959)       Memory evaluation: No (03/31/22 0959)                                    X.Risks: None evident (03/31/22 0959)     XI. Strengths and Assets Inventory: Intelligence; Family Support; Cooperative;Employment Status; Adequate living arrangements; Interests/Hobbies (03/31/22 0959)    VITALS:     No data found. Wt Readings from Last 3 Encounters:   06/07/21 91.2 kg (201 lb)   03/03/21 94.3 kg (208 lb)   03/16/20 85.3 kg (188 lb)     Temp Readings from Last 3 Encounters:   03/03/21 97 °F (36.1 °C) (Temporal)   07/20/19 97.7 °F (36.5 °C)   07/17/19 98.7 °F (37.1 °C)     BP Readings from Last 3 Encounters:   06/07/21 (!) 128/90   03/03/21 122/84   03/16/20 108/68     Pulse Readings from Last 3 Encounters:   06/07/21 64   03/03/21 89   03/16/20 68       Assessment: Kobi Phan is stable on his current medications. 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 Continue Lexapro 10 mg.  2.  Continue Adderall XR 20 mg in the AM and 20 mg IR in the afternoon. 3.  Continue Ambien 5 mg HS prn insomnia. 4.  Discussed various follow up options.

## 2024-07-03 ENCOUNTER — HOSPITAL ENCOUNTER (OUTPATIENT)
Facility: HOSPITAL | Age: 69
Discharge: HOME | End: 2024-07-06
Payer: MEDICARE

## 2024-07-03 ENCOUNTER — TRANSCRIBE ORDERS (OUTPATIENT)
Facility: HOSPITAL | Age: 69
End: 2024-07-03

## 2024-07-03 DIAGNOSIS — M48.02 SPINAL STENOSIS IN CERVICAL REGION: ICD-10-CM

## 2024-07-03 DIAGNOSIS — M99.01 CERVICAL SEGMENT DYSFUNCTION: Primary | ICD-10-CM

## 2024-07-03 DIAGNOSIS — M50.81: ICD-10-CM

## 2024-07-03 DIAGNOSIS — M99.01 CERVICAL SEGMENT DYSFUNCTION: ICD-10-CM

## 2024-07-03 PROCEDURE — 72050 X-RAY EXAM NECK SPINE 4/5VWS: CPT

## 2024-08-04 ENCOUNTER — HOSPITAL ENCOUNTER (EMERGENCY)
Facility: HOSPITAL | Age: 69
Discharge: HOME | End: 2024-08-04
Attending: FAMILY MEDICINE | Admitting: FAMILY MEDICINE
Payer: MEDICARE

## 2024-08-04 VITALS
SYSTOLIC BLOOD PRESSURE: 167 MMHG | HEIGHT: 71 IN | RESPIRATION RATE: 16 BRPM | BODY MASS INDEX: 27.9 KG/M2 | DIASTOLIC BLOOD PRESSURE: 101 MMHG | HEART RATE: 93 BPM | WEIGHT: 199.3 LBS | TEMPERATURE: 98.2 F | OXYGEN SATURATION: 100 %

## 2024-08-04 DIAGNOSIS — R31.0 GROSS HEMATURIA: Primary | ICD-10-CM

## 2024-08-04 PROCEDURE — 99283 EMERGENCY DEPT VISIT LOW MDM: CPT

## 2024-08-04 PROCEDURE — 81001 URINALYSIS AUTO W/SCOPE: CPT

## 2024-08-04 ASSESSMENT — PAIN - FUNCTIONAL ASSESSMENT: PAIN_FUNCTIONAL_ASSESSMENT: NONE - DENIES PAIN

## 2024-08-05 LAB
APPEARANCE UR: ABNORMAL
BACTERIA URNS QL MICRO: NEGATIVE /HPF
BILIRUB UR QL: NEGATIVE
COLOR UR: ABNORMAL
EPITH CASTS URNS QL MICRO: ABNORMAL /LPF
GLUCOSE UR STRIP.AUTO-MCNC: NEGATIVE MG/DL
HGB UR QL STRIP: ABNORMAL
KETONES UR QL STRIP.AUTO: NEGATIVE MG/DL
LEUKOCYTE ESTERASE UR QL STRIP.AUTO: NEGATIVE
MUCOUS THREADS URNS QL MICRO: ABNORMAL /LPF
NITRITE UR QL STRIP.AUTO: NEGATIVE
PH UR STRIP: 6 (ref 5–8)
PROT UR STRIP-MCNC: 100 MG/DL
RBC #/AREA URNS HPF: >100 /HPF (ref 0–5)
SP GR UR REFRACTOMETRY: 1.02 (ref 1–1.03)
URINE CULTURE IF INDICATED: ABNORMAL
UROBILINOGEN UR QL STRIP.AUTO: 0.2 EU/DL (ref 0.2–1)
WBC URNS QL MICRO: ABNORMAL /HPF (ref 0–4)

## 2024-08-05 NOTE — DISCHARGE INSTR - COC
Continuity of Care Form    Patient Name: Raymond Amin III   :  1955  MRN:  [id number]    Admit date:  2024  Discharge date:  ***    Code Status Order: No Order   Advance Directives:     Admitting Physician:  Cara Stewart MD  PCP: Alycia Ortiz APRN - NP    Discharging Nurse: ***  Discharging Hospital Unit/Room#: ED10/10  Discharging Unit Phone Number: ***    Emergency Contact:   Extended Emergency Contact Information  Primary Emergency Contact: Misael Billingsley   [facility]  Home Phone: [phone]  Relation: Other  Secondary Emergency Contact: Angella Billingsley  Address: [address] [place] [address]  Home Phone: [phone]  Mobile Phone: [phone]  Relation: Other    Past Surgical History:  Past Surgical History:   Procedure Laterality Date    COLONOSCOPY N/A 2017    COLONOSCOPY performed by Patrica Whitaker MD at [facility] MAIN OR    XR MIDLINE EQUAL OR GREATER THAN 5 YEARS  2019    XR MIDLINE EQUAL OR GREATER THAN 5 YEARS 2019    XR MIDLINE EQUAL OR GREATER THAN 5 YEARS  10/22/2019    XR MIDLINE EQUAL OR GREATER THAN 5 YEARS 10/22/2019       Immunization History:   Immunization History   Administered Date(s) Administered    COVID-19, MODERNA Bivalent, (age 12y+), IM, 50 mcg/0.5 mL 2022    COVID-19, MODERNA, (- formula), (age 12y+), IM, 50mcg/0.5mL 10/30/2023    COVID-19, PFIZER PURPLE top, DILUTE for use, (age 12 y+), 30mcg/0.3mL 03/10/2021, 2021, 2021, 2022       Active Problems:  Patient Active Problem List   Diagnosis Code    RBBB I45.10    HIV (human immunodeficiency virus infection) (HCC) B20    Anogenital HPV infection A63.0    Cyclothymia F34.0    Essential hypertension I10    Adult ADHD F90.9    Mild valvular heart disease I38    Hepatitis B immune Z78.9    Dyslipidemia E78.5    Anal cancer (HCC) C21.0    Diverticula of colon K57.30    PAC (premature atrial contraction) I49.1    Aortic

## 2024-08-05 NOTE — ED PROVIDER NOTES
[facility] EMERGENCY DEP  EMERGENCY DEPARTMENT ENCOUNTER       Pt Name: Raymond Amin III  MRN: [id number]  Birthdate 1955  Date of evaluation: 8/4/2024  Provider: Cara Stewart MD   PCP: Alycia Ortiz APRN - NP  Note Started: 10:06 PM EDT 8/4/24     CHIEF COMPLAINT       Chief Complaint   Patient presents with    Penis Injury        HISTORY OF PRESENT ILLNESS: 1 or more elements      History From: Patient  HPI Limitations: None     Raymond Amin III is a 69 y.o. male who presents to the emergency room with hematuria following insertion of a 10 cm sex toy that resembles a dilator into his penis.  He reports that when he got about 10 cm in, he had a fair amount of bleeding.  After few minutes the bleeding stopped, and upon his arrival to the emergency room the bleeding was resolved.  He was able to urinate, and has no bleeding or retention.  He does not have a urologist.     Nursing Notes were all reviewed and agreed with or any disagreements were addressed in the HPI.     REVIEW OF SYSTEMS      Review of Systems     Positives and Pertinent negatives as per HPI.    PAST HISTORY     Past Medical History:  Past Medical History:   Diagnosis Date    Adult ADHD 3/3/2021    Anal cancer (HCC)     Aortic root dilatation (HCC) 3/3/2021    Depression     Dyslipidemia 3/3/2021    Essential hypertension 3/3/2021    Hepatitis B immune 3/3/2021    Herpes zoster     HIV (human immunodeficiency virus infection) (HCC)     Hypercholesterolemia     Hypertension     IVCD (intraventricular conduction defect) 03/03/2021    Mild valvular heart disease 3/3/2021    Mild AI, MR, TR    PAC (premature atrial contraction) 3/3/2021         Past Surgical History:  Past Surgical History:   Procedure Laterality Date    COLONOSCOPY N/A 12/18/2017    COLONOSCOPY performed by Patrica Whitaker MD at [facility] MAIN OR    XR MIDLINE EQUAL OR GREATER THAN 5 YEARS  11/19/2019    XR MIDLINE EQUAL OR GREATER THAN 5 YEARS 11/19/2019    XR MIDLINE EQUAL OR GREATER

## 2024-08-05 NOTE — ED TRIAGE NOTES
Arrived ambulatory c/o urethral trauma. Pt inserted a urethral dilator in his urethra at home prior to arrival

## 2024-08-14 ENCOUNTER — HOSPITAL ENCOUNTER (EMERGENCY)
Facility: HOSPITAL | Age: 69
Discharge: HOME | End: 2024-08-14
Attending: EMERGENCY MEDICINE
Payer: MEDICARE

## 2024-08-14 ENCOUNTER — APPOINTMENT (OUTPATIENT)
Facility: HOSPITAL | Age: 69
End: 2024-08-14
Payer: MEDICARE

## 2024-08-14 VITALS
HEART RATE: 72 BPM | TEMPERATURE: 97.6 F | DIASTOLIC BLOOD PRESSURE: 87 MMHG | BODY MASS INDEX: 27.86 KG/M2 | HEIGHT: 71 IN | SYSTOLIC BLOOD PRESSURE: 124 MMHG | OXYGEN SATURATION: 97 % | RESPIRATION RATE: 18 BRPM | WEIGHT: 199 LBS

## 2024-08-14 DIAGNOSIS — S89.91XA INJURY OF RIGHT KNEE, INITIAL ENCOUNTER: ICD-10-CM

## 2024-08-14 DIAGNOSIS — S62.102A CLOSED FRACTURE OF LEFT WRIST, INITIAL ENCOUNTER: Primary | ICD-10-CM

## 2024-08-14 DIAGNOSIS — S50.311A ABRASION OF RIGHT ELBOW, INITIAL ENCOUNTER: ICD-10-CM
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
## 2024-08-14 PROCEDURE — 73562 X-RAY EXAM OF KNEE 3: CPT

## 2024-08-14 PROCEDURE — 73200 CT UPPER EXTREMITY W/O DYE: CPT

## 2024-08-14 PROCEDURE — 73080 X-RAY EXAM OF ELBOW: CPT

## 2024-08-14 PROCEDURE — 99284 EMERGENCY DEPT VISIT MOD MDM: CPT

## 2024-08-14 PROCEDURE — 6370000000 HC RX 637 (ALT 250 FOR IP): Performed by: EMERGENCY MEDICINE

## 2024-08-14 PROCEDURE — 29125 APPL SHORT ARM SPLINT STATIC: CPT

## 2024-08-14 PROCEDURE — 73110 X-RAY EXAM OF WRIST: CPT

## 2024-08-14 RX ORDER — DOXYCYCLINE 100 MG/1
100 CAPSULE ORAL 2 TIMES DAILY
Qty: 14 CAPSULE | Refills: 0 | Status: SHIPPED | OUTPATIENT
Start: 2024-08-14 | End: 2024-08-21

## 2024-08-14 RX ORDER — ACETAMINOPHEN 500 MG
1000 TABLET ORAL
Status: COMPLETED | OUTPATIENT
Start: 2024-08-14 | End: 2024-08-14

## 2024-08-14 RX ADMIN — ACETAMINOPHEN 1000 MG: 500 TABLET ORAL at 19:43

## 2024-08-14 ASSESSMENT — ENCOUNTER SYMPTOMS: COLOR CHANGE: 1

## 2024-08-14 ASSESSMENT — PAIN - FUNCTIONAL ASSESSMENT
PAIN_FUNCTIONAL_ASSESSMENT: ACTIVITIES ARE NOT PREVENTED
PAIN_FUNCTIONAL_ASSESSMENT: 0-10

## 2024-08-14 ASSESSMENT — PAIN DESCRIPTION - DESCRIPTORS: DESCRIPTORS: ACHING

## 2024-08-14 ASSESSMENT — LIFESTYLE VARIABLES
HOW OFTEN DO YOU HAVE A DRINK CONTAINING ALCOHOL: NEVER
HOW MANY STANDARD DRINKS CONTAINING ALCOHOL DO YOU HAVE ON A TYPICAL DAY: PATIENT DOES NOT DRINK

## 2024-08-14 ASSESSMENT — PAIN SCALES - GENERAL
PAINLEVEL_OUTOF10: 6
PAINLEVEL_OUTOF10: 6

## 2024-08-14 NOTE — ED TRIAGE NOTES
Pt arrived with complaint of fall.  Pt reports he was at work last night and fell down a few steps and felt okay initially, denies hitting his head and no LOC, pt reports now he having pain in his left hand, right knee and pt noted with a rug burn to his right elbow.  Pt is awake alert and oriented X4, pt and family educated on ER flow

## 2024-08-14 NOTE — ED PROVIDER NOTES
Medications    ACYCLOVIR (ZOVIRAX) 800 MG TABLET    Take 800 mg by mouth daily    AMPHETAMINE-DEXTROAMPHETAMINE (ADDERALL XR) 20 MG EXTENDED RELEASE CAPSULE    Take 20 mg by mouth daily.    AMPHETAMINE-DEXTROAMPHETAMINE (ADDERALL) 20 MG TABLET    Take 20 mg by mouth daily.    ATORVASTATIN (LIPITOR) 20 MG TABLET    Take by mouth daily    BICTEGRAVIR-EMTRICITAB-TENOFOVIR ALAFENAMIDE (BIKTARVY) -15 MG TABS    Take 1 tablet by mouth daily    ESCITALOPRAM (LEXAPRO) 10 MG TABLET    Take 10 mg by mouth daily    LOSARTAN (COZAAR) 50 MG TABLET    Take 50 mg by mouth daily    PSYLLIUM 100 % POWD    Take 6 g by mouth daily    TESTOSTERONE (ANDROGEL; TESTIM) 50 MG/5GM (1%) GEL 1% GEL    Place 50 mg onto the skin daily.    ZOLPIDEM (AMBIEN) 5 MG TABLET    Take 5 mg by mouth.       SCREENINGS               No data recorded         PHYSICAL EXAM      Vitals:    08/14/24 1855   BP: 124/87   Pulse: 72   Resp: 18   Temp: 97.6 °F (36.4 °C)   TempSrc: Oral   SpO2: 97%   Weight: 90.3 kg (199 lb)   Height: 1.803 m (5' 11\")        Physical Exam  Vitals and nursing note reviewed.   Constitutional:       General: He is not in acute distress.     Appearance: Normal appearance. He is normal weight. He is not ill-appearing or toxic-appearing.   HENT:      Head: Normocephalic and atraumatic.      Nose: Nose normal.      Mouth/Throat:      Mouth: Mucous membranes are moist.   Eyes:      Extraocular Movements: Extraocular movements intact.      Conjunctiva/sclera: Conjunctivae normal.      Pupils: Pupils are equal, round, and reactive to light.   Cardiovascular:      Rate and Rhythm: Normal rate and regular rhythm.      Pulses: Normal pulses.   Pulmonary:      Effort: Pulmonary effort is normal.   Abdominal:      General: Abdomen is flat. There is no distension.   Musculoskeletal:         General: Swelling, tenderness and signs of injury present. No deformity.      Right elbow: Laceration present. Normal range of motion.      Left elbow:

## 2024-10-08 ENCOUNTER — TRANSCRIBE ORDERS (OUTPATIENT)
Facility: HOSPITAL | Age: 69
End: 2024-10-08

## 2024-10-08 DIAGNOSIS — M54.2 CERVICALGIA: Primary | ICD-10-CM

## 2024-10-17 ENCOUNTER — HOSPITAL ENCOUNTER (OUTPATIENT)
Facility: HOSPITAL | Age: 69
Discharge: HOME | End: 2024-10-20
Payer: MEDICARE

## 2024-10-17 DIAGNOSIS — M54.2 CERVICALGIA: ICD-10-CM

## 2024-10-17 PROCEDURE — 72156 MRI NECK SPINE W/O & W/DYE: CPT

## 2024-10-17 PROCEDURE — A9579 GAD-BASE MR CONTRAST NOS,1ML: HCPCS | Performed by: NURSE PRACTITIONER

## 2024-10-17 PROCEDURE — 6360000004 HC RX CONTRAST MEDICATION: Performed by: NURSE PRACTITIONER

## 2024-10-17 RX ADMIN — GADOTERIDOL 20 ML: 279.3 INJECTION, SOLUTION INTRAVENOUS at 12:40
